# Patient Record
Sex: MALE | Race: WHITE | NOT HISPANIC OR LATINO | Employment: OTHER | URBAN - METROPOLITAN AREA
[De-identification: names, ages, dates, MRNs, and addresses within clinical notes are randomized per-mention and may not be internally consistent; named-entity substitution may affect disease eponyms.]

---

## 2019-06-08 ENCOUNTER — HOSPITAL ENCOUNTER (OUTPATIENT)
Dept: OTHER | Facility: HOSPITAL | Age: 54
Discharge: HOME | End: 2019-06-08
Attending: INTERNAL MEDICINE

## 2019-06-08 LAB
ALBUMIN SERPL-MCNC: 3.9 G/DL (ref 3.5–5)
ALBUMIN/GLOB SERPL: 1 {RATIO} (ref 1.4–2.6)
ALP SERPL-CCNC: 89 U/L (ref 56–119)
ALT SERPL-CCNC: 15 U/L (ref 10–40)
ANION GAP SERPL CALC-SCNC: 17 MMOL/L (ref 8–19)
AST SERPL-CCNC: 18 U/L (ref 15–50)
BILIRUB SERPL-MCNC: 0.27 MG/DL (ref 0.2–1.3)
BUN SERPL-MCNC: 15 MG/DL (ref 5–25)
BUN/CREAT SERPL: 16 {RATIO} (ref 6–20)
CALCIUM SERPL-MCNC: 9.3 MG/DL (ref 8.7–10.4)
CHLORIDE SERPL-SCNC: 101 MMOL/L (ref 99–111)
CHOLEST SERPL-MCNC: 176 MG/DL (ref 107–200)
CHOLEST/HDLC SERPL: 4.6 {RATIO} (ref 3–6)
CONV CO2: 24 MMOL/L (ref 22–32)
CONV TOTAL PROTEIN: 7.7 G/DL (ref 6.3–8.2)
CREAT UR-MCNC: 0.91 MG/DL (ref 0.7–1.2)
GFR SERPLBLD BASED ON 1.73 SQ M-ARVRAT: >60 ML/MIN/{1.73_M2}
GLOBULIN UR ELPH-MCNC: 3.8 G/DL (ref 2–3.5)
GLUCOSE SERPL-MCNC: 121 MG/DL (ref 70–99)
HDLC SERPL-MCNC: 38 MG/DL (ref 40–60)
LDLC SERPL CALC-MCNC: 93 MG/DL (ref 70–100)
OSMOLALITY SERPL CALC.SUM OF ELEC: 288 MOSM/KG (ref 273–304)
POTASSIUM SERPL-SCNC: 4.1 MMOL/L (ref 3.5–5.3)
SODIUM SERPL-SCNC: 138 MMOL/L (ref 135–147)
TRIGL SERPL-MCNC: 224 MG/DL (ref 40–150)
VLDLC SERPL-MCNC: 45 MG/DL (ref 5–37)

## 2020-02-07 ENCOUNTER — HOSPITAL ENCOUNTER (OUTPATIENT)
Dept: OTHER | Facility: HOSPITAL | Age: 55
Discharge: HOME | End: 2020-02-07
Attending: INTERNAL MEDICINE

## 2020-02-07 LAB
ALBUMIN SERPL-MCNC: 3.9 G/DL (ref 3.5–5)
ALBUMIN/GLOB SERPL: 1 {RATIO} (ref 1.4–2.6)
ALP SERPL-CCNC: 83 U/L (ref 56–119)
ALT SERPL-CCNC: 12 U/L (ref 10–40)
ANION GAP SERPL CALC-SCNC: 19 MMOL/L (ref 8–19)
AST SERPL-CCNC: 13 U/L (ref 15–50)
BILIRUB SERPL-MCNC: 0.31 MG/DL (ref 0.2–1.3)
BUN SERPL-MCNC: 13 MG/DL (ref 5–25)
BUN/CREAT SERPL: 15 {RATIO} (ref 6–20)
CALCIUM SERPL-MCNC: 9.4 MG/DL (ref 8.7–10.4)
CHLORIDE SERPL-SCNC: 101 MMOL/L (ref 99–111)
CHOLEST SERPL-MCNC: 175 MG/DL (ref 107–200)
CHOLEST/HDLC SERPL: 4.4 {RATIO} (ref 3–6)
CONV CO2: 24 MMOL/L (ref 22–32)
CONV TOTAL PROTEIN: 7.8 G/DL (ref 6.3–8.2)
CREAT UR-MCNC: 0.86 MG/DL (ref 0.7–1.2)
GFR SERPLBLD BASED ON 1.73 SQ M-ARVRAT: >60 ML/MIN/{1.73_M2}
GLOBULIN UR ELPH-MCNC: 3.9 G/DL (ref 2–3.5)
GLUCOSE SERPL-MCNC: 102 MG/DL (ref 70–99)
HDLC SERPL-MCNC: 40 MG/DL (ref 40–60)
LDLC SERPL CALC-MCNC: 118 MG/DL (ref 70–100)
OSMOLALITY SERPL CALC.SUM OF ELEC: 288 MOSM/KG (ref 273–304)
POTASSIUM SERPL-SCNC: 4.6 MMOL/L (ref 3.5–5.3)
SODIUM SERPL-SCNC: 139 MMOL/L (ref 135–147)
TRIGL SERPL-MCNC: 84 MG/DL (ref 40–150)
VLDLC SERPL-MCNC: 17 MG/DL (ref 5–37)

## 2020-08-15 ENCOUNTER — HOSPITAL ENCOUNTER (OUTPATIENT)
Dept: OTHER | Facility: HOSPITAL | Age: 55
Discharge: HOME | End: 2020-08-15
Attending: INTERNAL MEDICINE

## 2020-08-15 LAB
ALBUMIN SERPL-MCNC: 3.9 G/DL (ref 3.5–5)
ALBUMIN/GLOB SERPL: 1.1 {RATIO} (ref 1.4–2.6)
ALP SERPL-CCNC: 81 U/L (ref 56–119)
ALT SERPL-CCNC: 12 U/L (ref 10–40)
ANION GAP SERPL CALC-SCNC: 13 MMOL/L (ref 8–19)
AST SERPL-CCNC: 16 U/L (ref 15–50)
BILIRUB SERPL-MCNC: 0.35 MG/DL (ref 0.2–1.3)
BUN SERPL-MCNC: 12 MG/DL (ref 5–25)
BUN/CREAT SERPL: 13 {RATIO} (ref 6–20)
CALCIUM SERPL-MCNC: 9.9 MG/DL (ref 8.7–10.4)
CHLORIDE SERPL-SCNC: 103 MMOL/L (ref 99–111)
CHOLEST SERPL-MCNC: 189 MG/DL (ref 107–200)
CHOLEST/HDLC SERPL: 4.7 {RATIO} (ref 3–6)
CONV CO2: 28 MMOL/L (ref 22–32)
CONV TOTAL PROTEIN: 7.5 G/DL (ref 6.3–8.2)
CREAT UR-MCNC: 0.94 MG/DL (ref 0.7–1.2)
GFR SERPLBLD BASED ON 1.73 SQ M-ARVRAT: >60 ML/MIN/{1.73_M2}
GLOBULIN UR ELPH-MCNC: 3.6 G/DL (ref 2–3.5)
GLUCOSE SERPL-MCNC: 105 MG/DL (ref 70–99)
HDLC SERPL-MCNC: 40 MG/DL (ref 40–60)
LDLC SERPL CALC-MCNC: 126 MG/DL (ref 70–100)
OSMOLALITY SERPL CALC.SUM OF ELEC: 288 MOSM/KG (ref 273–304)
POTASSIUM SERPL-SCNC: 4.6 MMOL/L (ref 3.5–5.3)
SODIUM SERPL-SCNC: 139 MMOL/L (ref 135–147)
TRIGL SERPL-MCNC: 115 MG/DL (ref 40–150)
VLDLC SERPL-MCNC: 23 MG/DL (ref 5–37)

## 2021-03-20 ENCOUNTER — HOSPITAL ENCOUNTER (OUTPATIENT)
Dept: OTHER | Facility: HOSPITAL | Age: 56
Discharge: HOME | End: 2021-03-20
Attending: INTERNAL MEDICINE

## 2021-03-20 LAB
ALBUMIN SERPL-MCNC: 3.8 G/DL (ref 3.5–5)
ALBUMIN/GLOB SERPL: 1 {RATIO} (ref 1.4–2.6)
ALP SERPL-CCNC: 80 U/L (ref 56–119)
ALT SERPL-CCNC: 15 U/L (ref 10–40)
ANION GAP SERPL CALC-SCNC: 13 MMOL/L (ref 8–19)
AST SERPL-CCNC: 14 U/L (ref 15–50)
BILIRUB SERPL-MCNC: 0.44 MG/DL (ref 0.2–1.3)
BUN SERPL-MCNC: 10 MG/DL (ref 5–25)
BUN/CREAT SERPL: 13 {RATIO} (ref 6–20)
CALCIUM SERPL-MCNC: 9.5 MG/DL (ref 8.7–10.4)
CHLORIDE SERPL-SCNC: 103 MMOL/L (ref 99–111)
CHOLEST SERPL-MCNC: 187 MG/DL (ref 107–200)
CHOLEST/HDLC SERPL: 5.1 {RATIO} (ref 3–6)
CONV CO2: 27 MMOL/L (ref 22–32)
CONV TOTAL PROTEIN: 7.5 G/DL (ref 6.3–8.2)
CREAT UR-MCNC: 0.8 MG/DL (ref 0.7–1.2)
GFR SERPLBLD BASED ON 1.73 SQ M-ARVRAT: >60 ML/MIN/{1.73_M2}
GLOBULIN UR ELPH-MCNC: 3.7 G/DL (ref 2–3.5)
GLUCOSE SERPL-MCNC: 102 MG/DL (ref 70–99)
HDLC SERPL-MCNC: 37 MG/DL (ref 40–60)
LDLC SERPL CALC-MCNC: 120 MG/DL (ref 70–100)
OSMOLALITY SERPL CALC.SUM OF ELEC: 287 MOSM/KG (ref 273–304)
POTASSIUM SERPL-SCNC: 4.3 MMOL/L (ref 3.5–5.3)
SODIUM SERPL-SCNC: 139 MMOL/L (ref 135–147)
TRIGL SERPL-MCNC: 150 MG/DL (ref 40–150)
VLDLC SERPL-MCNC: 30 MG/DL (ref 5–37)

## 2021-10-02 ENCOUNTER — TRANSCRIBE ORDERS (OUTPATIENT)
Dept: LAB | Facility: HOSPITAL | Age: 56
End: 2021-10-02

## 2021-10-02 ENCOUNTER — LAB (OUTPATIENT)
Dept: LAB | Facility: HOSPITAL | Age: 56
End: 2021-10-02

## 2021-10-02 DIAGNOSIS — R07.9 CHEST PAIN, UNSPECIFIED TYPE: ICD-10-CM

## 2021-10-02 DIAGNOSIS — R07.9 CHEST PAIN, UNSPECIFIED TYPE: Primary | ICD-10-CM

## 2021-10-02 LAB
ALBUMIN SERPL-MCNC: 4.2 G/DL (ref 3.5–5.2)
ALBUMIN/GLOB SERPL: 1.3 G/DL
ALP SERPL-CCNC: 77 U/L (ref 39–117)
ALT SERPL W P-5'-P-CCNC: 14 U/L (ref 1–41)
ANION GAP SERPL CALCULATED.3IONS-SCNC: 11.1 MMOL/L (ref 5–15)
AST SERPL-CCNC: 12 U/L (ref 1–40)
BILIRUB SERPL-MCNC: 0.3 MG/DL (ref 0–1.2)
BUN SERPL-MCNC: 11 MG/DL (ref 6–20)
BUN/CREAT SERPL: 13.3 (ref 7–25)
CALCIUM SPEC-SCNC: 9.2 MG/DL (ref 8.6–10.5)
CHLORIDE SERPL-SCNC: 103 MMOL/L (ref 98–107)
CHOLEST SERPL-MCNC: 204 MG/DL (ref 0–200)
CO2 SERPL-SCNC: 24.9 MMOL/L (ref 22–29)
CREAT SERPL-MCNC: 0.83 MG/DL (ref 0.76–1.27)
GFR SERPL CREATININE-BSD FRML MDRD: 95 ML/MIN/1.73
GLOBULIN UR ELPH-MCNC: 3.3 GM/DL
GLUCOSE SERPL-MCNC: 103 MG/DL (ref 65–99)
HDLC SERPL-MCNC: 35 MG/DL (ref 40–60)
LDLC SERPL CALC-MCNC: 141 MG/DL (ref 0–100)
LDLC/HDLC SERPL: 3.95 {RATIO}
POTASSIUM SERPL-SCNC: 4.1 MMOL/L (ref 3.5–5.2)
PROT SERPL-MCNC: 7.5 G/DL (ref 6–8.5)
SODIUM SERPL-SCNC: 139 MMOL/L (ref 136–145)
TRIGL SERPL-MCNC: 153 MG/DL (ref 0–150)
VLDLC SERPL-MCNC: 28 MG/DL (ref 5–40)

## 2021-10-02 PROCEDURE — 80061 LIPID PANEL: CPT

## 2021-10-02 PROCEDURE — 36415 COLL VENOUS BLD VENIPUNCTURE: CPT

## 2021-10-02 PROCEDURE — 80053 COMPREHEN METABOLIC PANEL: CPT

## 2022-06-25 ENCOUNTER — LAB (OUTPATIENT)
Dept: LAB | Facility: HOSPITAL | Age: 57
End: 2022-06-25

## 2022-06-25 ENCOUNTER — TRANSCRIBE ORDERS (OUTPATIENT)
Dept: CARDIOLOGY | Facility: HOSPITAL | Age: 57
End: 2022-06-25

## 2022-06-25 DIAGNOSIS — I25.9 CHEST PAIN DUE TO MYOCARDIAL ISCHEMIA, UNSPECIFIED ISCHEMIC CHEST PAIN TYPE: ICD-10-CM

## 2022-06-25 DIAGNOSIS — I25.9 CHEST PAIN DUE TO MYOCARDIAL ISCHEMIA, UNSPECIFIED ISCHEMIC CHEST PAIN TYPE: Primary | ICD-10-CM

## 2022-06-25 LAB
ALBUMIN SERPL-MCNC: 4.1 G/DL (ref 3.5–5.2)
ALBUMIN/GLOB SERPL: 1.1 G/DL
ALP SERPL-CCNC: 79 U/L (ref 39–117)
ALT SERPL W P-5'-P-CCNC: 9 U/L (ref 1–41)
ANION GAP SERPL CALCULATED.3IONS-SCNC: 9 MMOL/L (ref 5–15)
AST SERPL-CCNC: 15 U/L (ref 1–40)
BILIRUB SERPL-MCNC: 0.5 MG/DL (ref 0–1.2)
BUN SERPL-MCNC: 9 MG/DL (ref 6–20)
BUN/CREAT SERPL: 11 (ref 7–25)
CALCIUM SPEC-SCNC: 9.4 MG/DL (ref 8.6–10.5)
CHLORIDE SERPL-SCNC: 103 MMOL/L (ref 98–107)
CHOLEST SERPL-MCNC: 207 MG/DL (ref 0–200)
CO2 SERPL-SCNC: 28 MMOL/L (ref 22–29)
CREAT SERPL-MCNC: 0.82 MG/DL (ref 0.76–1.27)
EGFRCR SERPLBLD CKD-EPI 2021: 101.8 ML/MIN/1.73
GLOBULIN UR ELPH-MCNC: 3.6 GM/DL
GLUCOSE SERPL-MCNC: 100 MG/DL (ref 65–99)
HDLC SERPL-MCNC: 40 MG/DL (ref 40–60)
LDLC SERPL CALC-MCNC: 149 MG/DL (ref 0–100)
LDLC/HDLC SERPL: 3.68 {RATIO}
POTASSIUM SERPL-SCNC: 4.2 MMOL/L (ref 3.5–5.2)
PROT SERPL-MCNC: 7.7 G/DL (ref 6–8.5)
SODIUM SERPL-SCNC: 140 MMOL/L (ref 136–145)
TRIGL SERPL-MCNC: 100 MG/DL (ref 0–150)
VLDLC SERPL-MCNC: 18 MG/DL (ref 5–40)

## 2022-06-25 PROCEDURE — 80053 COMPREHEN METABOLIC PANEL: CPT

## 2022-06-25 PROCEDURE — 80061 LIPID PANEL: CPT

## 2023-03-25 ENCOUNTER — TRANSCRIBE ORDERS (OUTPATIENT)
Dept: ADMINISTRATIVE | Facility: HOSPITAL | Age: 58
End: 2023-03-25
Payer: MEDICARE

## 2023-03-25 ENCOUNTER — LAB (OUTPATIENT)
Dept: LAB | Facility: HOSPITAL | Age: 58
End: 2023-03-25
Payer: MEDICARE

## 2023-03-25 DIAGNOSIS — R07.9 CHEST PAIN, UNSPECIFIED TYPE: Primary | ICD-10-CM

## 2023-03-25 DIAGNOSIS — R07.9 CHEST PAIN, UNSPECIFIED TYPE: ICD-10-CM

## 2023-03-25 LAB
ALBUMIN SERPL-MCNC: 3.9 G/DL (ref 3.5–5.2)
ALBUMIN/GLOB SERPL: 1.1 G/DL
ALP SERPL-CCNC: 81 U/L (ref 39–117)
ALT SERPL W P-5'-P-CCNC: 14 U/L (ref 1–41)
ANION GAP SERPL CALCULATED.3IONS-SCNC: 7.1 MMOL/L (ref 5–15)
AST SERPL-CCNC: 15 U/L (ref 1–40)
BILIRUB SERPL-MCNC: 0.4 MG/DL (ref 0–1.2)
BUN SERPL-MCNC: 9 MG/DL (ref 6–20)
BUN/CREAT SERPL: 10.6 (ref 7–25)
CALCIUM SPEC-SCNC: 9.5 MG/DL (ref 8.6–10.5)
CHLORIDE SERPL-SCNC: 103 MMOL/L (ref 98–107)
CHOLEST SERPL-MCNC: 217 MG/DL (ref 0–200)
CO2 SERPL-SCNC: 27.9 MMOL/L (ref 22–29)
CREAT SERPL-MCNC: 0.85 MG/DL (ref 0.76–1.27)
EGFRCR SERPLBLD CKD-EPI 2021: 100.1 ML/MIN/1.73
GLOBULIN UR ELPH-MCNC: 3.7 GM/DL
GLUCOSE SERPL-MCNC: 109 MG/DL (ref 65–99)
HDLC SERPL-MCNC: 42 MG/DL (ref 40–60)
LDLC SERPL CALC-MCNC: 152 MG/DL (ref 0–100)
LDLC/HDLC SERPL: 3.55 {RATIO}
POTASSIUM SERPL-SCNC: 4.4 MMOL/L (ref 3.5–5.2)
PROT SERPL-MCNC: 7.6 G/DL (ref 6–8.5)
SODIUM SERPL-SCNC: 138 MMOL/L (ref 136–145)
TRIGL SERPL-MCNC: 129 MG/DL (ref 0–150)
VLDLC SERPL-MCNC: 23 MG/DL (ref 5–40)

## 2023-03-25 PROCEDURE — 36415 COLL VENOUS BLD VENIPUNCTURE: CPT

## 2023-03-25 PROCEDURE — 80053 COMPREHEN METABOLIC PANEL: CPT

## 2023-03-25 PROCEDURE — 80061 LIPID PANEL: CPT

## 2023-12-22 ENCOUNTER — TRANSCRIBE ORDERS (OUTPATIENT)
Dept: ADMINISTRATIVE | Facility: HOSPITAL | Age: 58
End: 2023-12-22
Payer: MEDICARE

## 2023-12-22 ENCOUNTER — LAB (OUTPATIENT)
Dept: LAB | Facility: HOSPITAL | Age: 58
End: 2023-12-22
Payer: MEDICARE

## 2023-12-22 DIAGNOSIS — R07.9 CHEST PAIN, UNSPECIFIED TYPE: ICD-10-CM

## 2023-12-22 DIAGNOSIS — R07.9 CHEST PAIN, UNSPECIFIED TYPE: Primary | ICD-10-CM

## 2023-12-22 LAB
ALBUMIN SERPL-MCNC: 4 G/DL (ref 3.5–5.2)
ALBUMIN/GLOB SERPL: 1.2 G/DL
ALP SERPL-CCNC: 76 U/L (ref 39–117)
ALT SERPL W P-5'-P-CCNC: 13 U/L (ref 1–41)
ANION GAP SERPL CALCULATED.3IONS-SCNC: 13.6 MMOL/L (ref 5–15)
AST SERPL-CCNC: 13 U/L (ref 1–40)
BILIRUB SERPL-MCNC: 0.3 MG/DL (ref 0–1.2)
BUN SERPL-MCNC: 9 MG/DL (ref 8–23)
BUN/CREAT SERPL: 11.3 (ref 7–25)
CALCIUM SPEC-SCNC: 9.3 MG/DL (ref 8.6–10.5)
CHLORIDE SERPL-SCNC: 104 MMOL/L (ref 98–107)
CHOLEST SERPL-MCNC: 208 MG/DL (ref 0–200)
CO2 SERPL-SCNC: 25.4 MMOL/L (ref 22–29)
CREAT SERPL-MCNC: 0.8 MG/DL (ref 0.76–1.27)
EGFRCR SERPLBLD CKD-EPI 2021: 101.3 ML/MIN/1.73
GLOBULIN UR ELPH-MCNC: 3.4 GM/DL
GLUCOSE SERPL-MCNC: 104 MG/DL (ref 65–99)
HDLC SERPL-MCNC: 39 MG/DL (ref 40–60)
LDLC SERPL CALC-MCNC: 139 MG/DL (ref 0–100)
LDLC/HDLC SERPL: 3.47 {RATIO}
POTASSIUM SERPL-SCNC: 4.3 MMOL/L (ref 3.5–5.2)
PROT SERPL-MCNC: 7.4 G/DL (ref 6–8.5)
SODIUM SERPL-SCNC: 143 MMOL/L (ref 136–145)
TRIGL SERPL-MCNC: 168 MG/DL (ref 0–150)
VLDLC SERPL-MCNC: 30 MG/DL (ref 5–40)

## 2023-12-22 PROCEDURE — 80061 LIPID PANEL: CPT

## 2023-12-22 PROCEDURE — 36415 COLL VENOUS BLD VENIPUNCTURE: CPT

## 2023-12-22 PROCEDURE — 80053 COMPREHEN METABOLIC PANEL: CPT

## 2024-03-25 ENCOUNTER — OFFICE VISIT (OUTPATIENT)
Dept: CARDIOLOGY | Facility: CLINIC | Age: 59
End: 2024-03-25
Payer: MEDICARE

## 2024-03-25 VITALS
HEART RATE: 64 BPM | WEIGHT: 183 LBS | SYSTOLIC BLOOD PRESSURE: 130 MMHG | DIASTOLIC BLOOD PRESSURE: 89 MMHG | HEIGHT: 67 IN | BODY MASS INDEX: 28.72 KG/M2

## 2024-03-25 DIAGNOSIS — I25.10 CORONARY ARTERY DISEASE INVOLVING NATIVE CORONARY ARTERY OF NATIVE HEART WITHOUT ANGINA PECTORIS: Primary | ICD-10-CM

## 2024-03-25 DIAGNOSIS — E78.2 MIXED HYPERLIPIDEMIA: ICD-10-CM

## 2024-03-25 DIAGNOSIS — I10 PRIMARY HYPERTENSION: ICD-10-CM

## 2024-03-25 PROCEDURE — 99204 OFFICE O/P NEW MOD 45 MIN: CPT | Performed by: INTERNAL MEDICINE

## 2024-03-25 RX ORDER — ROSUVASTATIN CALCIUM 10 MG/1
10 TABLET, COATED ORAL EVERY 24 HOURS
COMMUNITY

## 2024-03-25 RX ORDER — ESOMEPRAZOLE MAGNESIUM 40 MG/1
40 CAPSULE, DELAYED RELEASE ORAL EVERY 24 HOURS
COMMUNITY

## 2024-03-25 RX ORDER — ASPIRIN 81 MG/1
81 TABLET ORAL DAILY
COMMUNITY

## 2024-03-25 RX ORDER — METOPROLOL TARTRATE 50 MG/1
50 TABLET, FILM COATED ORAL EVERY 12 HOURS SCHEDULED
COMMUNITY

## 2024-03-25 RX ORDER — LISINOPRIL 20 MG/1
20 TABLET ORAL EVERY 12 HOURS SCHEDULED
COMMUNITY

## 2024-03-25 RX ORDER — PRASUGREL 10 MG/1
10 TABLET, FILM COATED ORAL
COMMUNITY

## 2024-03-25 RX ORDER — ACETAMINOPHEN 500 MG
500 TABLET ORAL EVERY 12 HOURS SCHEDULED
COMMUNITY

## 2024-03-25 RX ORDER — CYCLOBENZAPRINE HCL 10 MG
10 TABLET ORAL EVERY 24 HOURS
COMMUNITY

## 2024-03-25 NOTE — PROGRESS NOTES
Chief Complaint  Establish Care    Subjective        Tom Rose presents to [facility] CARDIOLOGY  History of present illness:    Patient is a 58-year-old male with past medical history significant for coronary artery disease.  He did have multiple stents last 1 in .  He had repeat heart cath 2013 that showed chronic total occlusion of the LAD.  Before the chronic total occlusion there was a diagonal came out that had a patent stent.  The left circumflex and RCA had patent stents.  There was no significant disease.  He had an echocardiogram in  that showed normal ejection fraction and no significant valvular disease.  He is very active with no chest pain.  He notes no palpitations as long as he is on the metoprolol.  He denies any bleeding problems.  He does not smoke or drink alcohol.  Mother had history of heart attack and father  of heart attack.      History reviewed. No pertinent past medical history.      History reviewed. No pertinent surgical history.       Social History     Socioeconomic History    Marital status:    Tobacco Use    Smoking status: Former     Types: Cigarettes    Smokeless tobacco: Never   Vaping Use    Vaping status: Never Used   Substance and Sexual Activity    Alcohol use: Never    Drug use: Never    Sexual activity: Defer         History reviewed. No pertinent family history.       Allergies   Allergen Reactions    Clopidogrel Other (See Comments)    Iodinated Contrast Media Other (See Comments)            Current Outpatient Medications:     acetaminophen (TYLENOL) 500 MG tablet, Take 1 tablet by mouth Every 12 (Twelve) Hours., Disp: , Rfl:     aspirin 81 MG EC tablet, Take 1 tablet by mouth Daily., Disp: , Rfl:     cyclobenzaprine (FLEXERIL) 10 MG tablet, Take 1 tablet by mouth Daily., Disp: , Rfl:     esomeprazole (nexIUM) 40 MG capsule, Take 1 capsule by mouth Daily., Disp: , Rfl:     lisinopril (PRINIVIL,ZESTRIL) 20 MG tablet, Take  "1 tablet by mouth Every 12 (Twelve) Hours., Disp: , Rfl:     metoprolol tartrate (LOPRESSOR) 50 MG tablet, Take 1 tablet by mouth Every 12 (Twelve) Hours., Disp: , Rfl:     prasugrel (EFFIENT) 10 MG tablet, 1 tablet. As directed by the provider, Disp: , Rfl:     rosuvastatin (CRESTOR) 10 MG tablet, Take 1 tablet by mouth Daily., Disp: , Rfl:       ROS:  Cardiac review of systems negative.    Objective     /89   Pulse 64   Ht 170.2 cm (67\")   Wt 83 kg (183 lb)   BMI 28.66 kg/m²       General Appearance:   well developed  well nourished  HENT:   oropharynx moist  lips not cyanotic  Respiratory:  no respiratory distress  normal breath sounds  no rales  Cardiovascular:  no jugular venous distention  regular rhythm  S1 normal, S2 normal  no S3, no S4   no murmur  no rub, no thrill  No carotid bruit  pedal pulses normal  lower extremity edema: none    Musculoskeletal:  no clubbing of fingers.   normocephalic, head atraumatic  Skin:   warm, dry  Psychiatric:  judgement and insight appropriate  normal mood and affect    ECHO:    STRESS:    CATH:  No results found for this or any previous visit.    BMP:     Glucose   Date Value Ref Range Status   12/22/2023 104 (H) 65 - 99 mg/dL Final     BUN   Date Value Ref Range Status   12/22/2023 9 8 - 23 mg/dL Final     Creatinine   Date Value Ref Range Status   12/22/2023 0.80 0.76 - 1.27 mg/dL Final     Sodium   Date Value Ref Range Status   12/22/2023 143 136 - 145 mmol/L Final     Potassium   Date Value Ref Range Status   12/22/2023 4.3 3.5 - 5.2 mmol/L Final     Chloride   Date Value Ref Range Status   12/22/2023 104 98 - 107 mmol/L Final     CO2   Date Value Ref Range Status   12/22/2023 25.4 22.0 - 29.0 mmol/L Final     Calcium   Date Value Ref Range Status   12/22/2023 9.3 8.6 - 10.5 mg/dL Final     BUN/Creatinine Ratio   Date Value Ref Range Status   12/22/2023 11.3 7.0 - 25.0 Final     Anion Gap   Date Value Ref Range Status   12/22/2023 13.6 5.0 - 15.0 mmol/L Final "     eGFR   Date Value Ref Range Status   12/22/2023 101.3 >60.0 mL/min/1.73 Final     LIPIDS:  Total Cholesterol   Date Value Ref Range Status   12/22/2023 208 (H) 0 - 200 mg/dL Final     Triglycerides   Date Value Ref Range Status   12/22/2023 168 (H) 0 - 150 mg/dL Final     HDL Cholesterol   Date Value Ref Range Status   12/22/2023 39 (L) 40 - 60 mg/dL Final     LDL Cholesterol    Date Value Ref Range Status   12/22/2023 139 (H) 0 - 100 mg/dL Final     VLDL Cholesterol   Date Value Ref Range Status   12/22/2023 30 5 - 40 mg/dL Final     LDL/HDL Ratio   Date Value Ref Range Status   12/22/2023 3.47  Final         Procedures             ASSESSMENT:  Diagnoses and all orders for this visit:    1. Coronary artery disease involving native coronary artery of native heart without angina pectoris (Primary)  -     Lipid Panel; Future    2. Primary hypertension    3. Mixed hyperlipidemia  -     Lipid Panel; Future         PLAN:    1.  Continue the aspirin and the Effient.  He notes no bleeding problems.  He can stop the Effient 7 days before the teeth extraction surgery.  I would want him on the 81 mg aspirin perioperatively.  2.  Continue the Crestor.  Patient notes he had not been taking it in the past as statins hurt his legs and arms.  He is tolerating this dose of the Crestor.  We will recheck a fasted lipid profile.  3.  Blood pressure is under good control.  4.  Patient does not smoke.  5.  Patient is very active with no exertional chest pain.  His modifiable risk factors are under excellent control.  We will see what his cholesterol looks like on the Crestor and if needed consider adding the injections.  His goal LDL is less than 70 but would like to get him closer to 30-50 range.      Return in about 6 months (around 9/25/2024).     Patient was given instructions and counseling regarding his condition or for health maintenance advice. Please see specific information pulled into the AVS if appropriate.         Brad  Espinoza Connell MD   3/25/2024  09:09 EDT

## 2024-03-26 ENCOUNTER — LAB (OUTPATIENT)
Dept: LAB | Facility: HOSPITAL | Age: 59
End: 2024-03-26
Payer: MEDICARE

## 2024-03-26 DIAGNOSIS — E78.2 MIXED HYPERLIPIDEMIA: ICD-10-CM

## 2024-03-26 DIAGNOSIS — I25.10 CORONARY ARTERY DISEASE INVOLVING NATIVE CORONARY ARTERY OF NATIVE HEART WITHOUT ANGINA PECTORIS: ICD-10-CM

## 2024-03-26 LAB
CHOLEST SERPL-MCNC: 135 MG/DL (ref 0–200)
HDLC SERPL-MCNC: 40 MG/DL (ref 40–60)
LDLC SERPL CALC-MCNC: 74 MG/DL (ref 0–100)
LDLC/HDLC SERPL: 1.82 {RATIO}
TRIGL SERPL-MCNC: 112 MG/DL (ref 0–150)
VLDLC SERPL-MCNC: 21 MG/DL (ref 5–40)

## 2024-03-26 PROCEDURE — 80061 LIPID PANEL: CPT

## 2024-03-26 PROCEDURE — 36415 COLL VENOUS BLD VENIPUNCTURE: CPT

## 2024-05-23 RX ORDER — CYCLOBENZAPRINE HCL 10 MG
10 TABLET ORAL EVERY 24 HOURS
OUTPATIENT
Start: 2024-05-23

## 2024-05-23 RX ORDER — PRASUGREL 10 MG/1
10 TABLET, FILM COATED ORAL DAILY
Qty: 90 TABLET | Refills: 1 | Status: SHIPPED | OUTPATIENT
Start: 2024-05-23

## 2024-05-23 RX ORDER — ASPIRIN 81 MG/1
81 TABLET ORAL DAILY
OUTPATIENT
Start: 2024-05-23

## 2024-05-23 RX ORDER — LISINOPRIL 20 MG/1
20 TABLET ORAL EVERY 12 HOURS SCHEDULED
Qty: 180 TABLET | Refills: 1 | Status: SHIPPED | OUTPATIENT
Start: 2024-05-23

## 2024-05-23 RX ORDER — ROSUVASTATIN CALCIUM 10 MG/1
10 TABLET, COATED ORAL EVERY 24 HOURS
OUTPATIENT
Start: 2024-05-23

## 2024-05-23 RX ORDER — METOPROLOL TARTRATE 50 MG/1
50 TABLET, FILM COATED ORAL EVERY 12 HOURS SCHEDULED
Qty: 180 TABLET | Refills: 1 | Status: SHIPPED | OUTPATIENT
Start: 2024-05-23

## 2024-05-23 RX ORDER — LISINOPRIL 20 MG/1
20 TABLET ORAL EVERY 12 HOURS SCHEDULED
OUTPATIENT
Start: 2024-05-23

## 2024-05-23 RX ORDER — ROSUVASTATIN CALCIUM 10 MG/1
10 TABLET, COATED ORAL EVERY 24 HOURS
Qty: 90 TABLET | Refills: 1 | Status: SHIPPED | OUTPATIENT
Start: 2024-05-23

## 2024-05-23 RX ORDER — ASPIRIN 81 MG/1
81 TABLET ORAL DAILY
Qty: 90 TABLET | Refills: 1 | Status: SHIPPED | OUTPATIENT
Start: 2024-05-23

## 2024-05-23 RX ORDER — ESOMEPRAZOLE MAGNESIUM 40 MG/1
40 CAPSULE, DELAYED RELEASE ORAL EVERY 24 HOURS
OUTPATIENT
Start: 2024-05-23

## 2024-05-23 RX ORDER — PRASUGREL 10 MG/1
10 TABLET, FILM COATED ORAL
OUTPATIENT
Start: 2024-05-23

## 2024-05-23 RX ORDER — METOPROLOL TARTRATE 50 MG/1
50 TABLET, FILM COATED ORAL EVERY 12 HOURS SCHEDULED
OUTPATIENT
Start: 2024-05-23

## 2024-10-07 ENCOUNTER — OFFICE VISIT (OUTPATIENT)
Dept: CARDIOLOGY | Facility: CLINIC | Age: 59
End: 2024-10-07
Payer: MEDICARE

## 2024-10-07 VITALS
WEIGHT: 177.4 LBS | BODY MASS INDEX: 27.84 KG/M2 | HEART RATE: 63 BPM | SYSTOLIC BLOOD PRESSURE: 159 MMHG | DIASTOLIC BLOOD PRESSURE: 79 MMHG | HEIGHT: 67 IN

## 2024-10-07 DIAGNOSIS — E78.2 MIXED HYPERLIPIDEMIA: ICD-10-CM

## 2024-10-07 DIAGNOSIS — I25.10 CORONARY ARTERY DISEASE INVOLVING NATIVE CORONARY ARTERY OF NATIVE HEART WITHOUT ANGINA PECTORIS: Primary | ICD-10-CM

## 2024-10-07 DIAGNOSIS — I10 PRIMARY HYPERTENSION: ICD-10-CM

## 2024-10-07 PROCEDURE — 99214 OFFICE O/P EST MOD 30 MIN: CPT | Performed by: INTERNAL MEDICINE

## 2024-10-07 NOTE — PROGRESS NOTES
Chief Complaint  Follow-up and Coronary Artery Disease    Subjective        Tom Rose presents to [facility] CARDIOLOGY  History of present illness:    Patient states overall he is doing well.  He is walking a mile every day with no chest pain.  He notes no bleeding problems.  He gets his blood pressure cuff checked at his primary care every 6 months and it is very accurate.  He states he is getting values from 111 to 120 mmHg systolic at home.  He is taking the metoprolol 50 mg 2 pills twice a day.      History reviewed. No pertinent past medical history.      No past surgical history on file.       Social History     Socioeconomic History    Marital status:    Tobacco Use    Smoking status: Former     Types: Cigarettes    Smokeless tobacco: Never   Vaping Use    Vaping status: Never Used   Substance and Sexual Activity    Alcohol use: Never    Drug use: Never    Sexual activity: Defer         No family history on file.       Allergies   Allergen Reactions    Clopidogrel Other (See Comments)    Iodinated Contrast Media Other (See Comments)            Current Outpatient Medications:     acetaminophen (TYLENOL) 500 MG tablet, Take 1 tablet by mouth Every 12 (Twelve) Hours., Disp: , Rfl:     aspirin 81 MG EC tablet, Take 1 tablet by mouth Daily., Disp: 90 tablet, Rfl: 1    cyclobenzaprine (FLEXERIL) 10 MG tablet, Take 0.5 tablets by mouth As Needed., Disp: , Rfl:     esomeprazole (nexIUM) 40 MG capsule, Take 1 capsule by mouth Daily., Disp: , Rfl:     lisinopril (PRINIVIL,ZESTRIL) 20 MG tablet, Take 1 tablet by mouth Every 12 (Twelve) Hours., Disp: 180 tablet, Rfl: 1    metoprolol tartrate (LOPRESSOR) 50 MG tablet, Take 1 tablet by mouth Every 12 (Twelve) Hours. (Patient taking differently: Take 1 tablet by mouth Every 12 (Twelve) Hours. 2 tabs every 12 hours), Disp: 180 tablet, Rfl: 1    prasugrel (EFFIENT) 10 MG tablet, Take 1 tablet by mouth Daily., Disp: 90 tablet, Rfl: 1     "rosuvastatin (CRESTOR) 10 MG tablet, Take 1 tablet by mouth Daily., Disp: 90 tablet, Rfl: 1      ROS:  Cardiac review of systems negative.    Objective     /79   Pulse 63   Ht 170.2 cm (67.01\")   Wt 80.5 kg (177 lb 6.4 oz)   BMI 27.78 kg/m²       General Appearance:   well developed  well nourished  HENT:   oropharynx moist  lips not cyanotic  Respiratory:  no respiratory distress  normal breath sounds  no rales  Cardiovascular:  no jugular venous distention  regular rhythm  S1 normal, S2 normal  no S3, no S4   no murmur  no rub, no thrill  No carotid bruit  pedal pulses normal  lower extremity edema: none    Musculoskeletal:  no clubbing of fingers.   normocephalic, head atraumatic  Skin:   warm, dry  Psychiatric:  judgement and insight appropriate  normal mood and affect    ECHO:    STRESS:    CATH:  No results found for this or any previous visit.    BMP:     Glucose   Date Value Ref Range Status   12/22/2023 104 (H) 65 - 99 mg/dL Final     BUN   Date Value Ref Range Status   12/22/2023 9 8 - 23 mg/dL Final     Creatinine   Date Value Ref Range Status   12/22/2023 0.80 0.76 - 1.27 mg/dL Final     Sodium   Date Value Ref Range Status   12/22/2023 143 136 - 145 mmol/L Final     Potassium   Date Value Ref Range Status   12/22/2023 4.3 3.5 - 5.2 mmol/L Final     Chloride   Date Value Ref Range Status   12/22/2023 104 98 - 107 mmol/L Final     CO2   Date Value Ref Range Status   12/22/2023 25.4 22.0 - 29.0 mmol/L Final     Calcium   Date Value Ref Range Status   12/22/2023 9.3 8.6 - 10.5 mg/dL Final     BUN/Creatinine Ratio   Date Value Ref Range Status   12/22/2023 11.3 7.0 - 25.0 Final     Anion Gap   Date Value Ref Range Status   12/22/2023 13.6 5.0 - 15.0 mmol/L Final     eGFR   Date Value Ref Range Status   12/22/2023 101.3 >60.0 mL/min/1.73 Final     LIPIDS:  Total Cholesterol   Date Value Ref Range Status   03/26/2024 135 0 - 200 mg/dL Final     Triglycerides   Date Value Ref Range Status   03/26/2024 " 112 0 - 150 mg/dL Final     HDL Cholesterol   Date Value Ref Range Status   03/26/2024 40 40 - 60 mg/dL Final     LDL Cholesterol    Date Value Ref Range Status   03/26/2024 74 0 - 100 mg/dL Final     VLDL Cholesterol   Date Value Ref Range Status   03/26/2024 21 5 - 40 mg/dL Final     LDL/HDL Ratio   Date Value Ref Range Status   03/26/2024 1.82  Final         Procedures             ASSESSMENT:  Diagnoses and all orders for this visit:    1. Coronary artery disease involving native coronary artery of native heart without angina pectoris (Primary)    2. Primary hypertension    3. Mixed hyperlipidemia         PLAN:    1.  Continue the aspirin and Effient.  The patient notes no problems with bleeding.  2.  Patient currently takes metoprolol 50 mg 2 pills twice a day.  He has been doing this for a long time and does not want to switch to the 100 mg pill.  3.  Continue the Crestor.  Patient had cholesterol checked 3/26/2024 with LDL 74, HDL 40, and triglycerides 112.  These are much improved.  We will continue to monitor.  If needed could add the cholesterol injections.  Asked him to have any blood work faxed to us through primary care's office to also checks the cholesterol.  4.  Patient's blood pressure is elevated here but he has his blood pressure monitor checked at his primary care's every 6 months and it is accurate.  He is getting good values at home.  We will just continue to monitor.  5.  Patient does not smoke.  6.  Encouraged the patient to continue to remain active and call us if any exertional chest pain.      Return in about 6 months (around 4/7/2025).     Patient was given instructions and counseling regarding his condition or for health maintenance advice. Please see specific information pulled into the AVS if appropriate.         Brad Connell MD   10/7/2024  10:35 EDT

## 2024-11-18 RX ORDER — METOPROLOL TARTRATE 50 MG
50 TABLET ORAL EVERY 12 HOURS SCHEDULED
Qty: 60 TABLET | Refills: 3 | Status: SHIPPED | OUTPATIENT
Start: 2024-11-18 | End: 2024-11-22 | Stop reason: SDUPTHER

## 2024-11-22 RX ORDER — METOPROLOL TARTRATE 50 MG
100 TABLET ORAL 2 TIMES DAILY
Qty: 360 TABLET | Refills: 3 | Status: SHIPPED | OUTPATIENT
Start: 2024-11-22

## 2024-11-22 NOTE — TELEPHONE ENCOUNTER
Verified dosage of medication from last office note per Dr. Connell. Note stated patient takes metoprolol 50mg 2 tablets twice daily.  Called pharmacy to verify directions for medication refill.

## 2024-12-23 ENCOUNTER — LAB (OUTPATIENT)
Facility: HOSPITAL | Age: 59
End: 2024-12-23
Payer: MEDICARE

## 2024-12-23 ENCOUNTER — TRANSCRIBE ORDERS (OUTPATIENT)
Dept: ADMINISTRATIVE | Facility: HOSPITAL | Age: 59
End: 2024-12-23
Payer: MEDICARE

## 2024-12-23 DIAGNOSIS — E78.5 HYPERLIPIDEMIA, UNSPECIFIED HYPERLIPIDEMIA TYPE: ICD-10-CM

## 2024-12-23 DIAGNOSIS — I50.9 HEART FAILURE, UNSPECIFIED HF CHRONICITY, UNSPECIFIED HEART FAILURE TYPE: ICD-10-CM

## 2024-12-23 DIAGNOSIS — E78.5 HYPERLIPIDEMIA, UNSPECIFIED HYPERLIPIDEMIA TYPE: Primary | ICD-10-CM

## 2024-12-23 LAB
ALBUMIN SERPL-MCNC: 4.1 G/DL (ref 3.5–5.2)
ALBUMIN/GLOB SERPL: 1.2 G/DL
ALP SERPL-CCNC: 73 U/L (ref 39–117)
ALT SERPL W P-5'-P-CCNC: 11 U/L (ref 1–41)
ANION GAP SERPL CALCULATED.3IONS-SCNC: 7.5 MMOL/L (ref 5–15)
AST SERPL-CCNC: 16 U/L (ref 1–40)
BASOPHILS # BLD AUTO: 0.04 10*3/MM3 (ref 0–0.2)
BASOPHILS NFR BLD AUTO: 0.7 % (ref 0–1.5)
BILIRUB SERPL-MCNC: 0.4 MG/DL (ref 0–1.2)
BUN SERPL-MCNC: 12 MG/DL (ref 8–23)
BUN/CREAT SERPL: 14 (ref 7–25)
CALCIUM SPEC-SCNC: 9.1 MG/DL (ref 8.6–10.5)
CHLORIDE SERPL-SCNC: 104 MMOL/L (ref 98–107)
CHOLEST SERPL-MCNC: 129 MG/DL (ref 0–200)
CO2 SERPL-SCNC: 26.5 MMOL/L (ref 22–29)
CREAT SERPL-MCNC: 0.86 MG/DL (ref 0.76–1.27)
DEPRECATED RDW RBC AUTO: 39.5 FL (ref 37–54)
EGFRCR SERPLBLD CKD-EPI 2021: 98.5 ML/MIN/1.73
EOSINOPHIL # BLD AUTO: 0.17 10*3/MM3 (ref 0–0.4)
EOSINOPHIL NFR BLD AUTO: 3.1 % (ref 0.3–6.2)
ERYTHROCYTE [DISTWIDTH] IN BLOOD BY AUTOMATED COUNT: 12.3 % (ref 12.3–15.4)
GLOBULIN UR ELPH-MCNC: 3.5 GM/DL
GLUCOSE SERPL-MCNC: 109 MG/DL (ref 65–99)
HCT VFR BLD AUTO: 41 % (ref 37.5–51)
HDLC SERPL-MCNC: 39 MG/DL (ref 40–60)
HGB BLD-MCNC: 13.7 G/DL (ref 13–17.7)
IMM GRANULOCYTES # BLD AUTO: 0.02 10*3/MM3 (ref 0–0.05)
IMM GRANULOCYTES NFR BLD AUTO: 0.4 % (ref 0–0.5)
LDLC SERPL CALC-MCNC: 68 MG/DL (ref 0–100)
LDLC/HDLC SERPL: 1.69 {RATIO}
LYMPHOCYTES # BLD AUTO: 1.43 10*3/MM3 (ref 0.7–3.1)
LYMPHOCYTES NFR BLD AUTO: 25.9 % (ref 19.6–45.3)
MCH RBC QN AUTO: 29.3 PG (ref 26.6–33)
MCHC RBC AUTO-ENTMCNC: 33.4 G/DL (ref 31.5–35.7)
MCV RBC AUTO: 87.8 FL (ref 79–97)
MONOCYTES # BLD AUTO: 0.62 10*3/MM3 (ref 0.1–0.9)
MONOCYTES NFR BLD AUTO: 11.2 % (ref 5–12)
NEUTROPHILS NFR BLD AUTO: 3.24 10*3/MM3 (ref 1.7–7)
NEUTROPHILS NFR BLD AUTO: 58.7 % (ref 42.7–76)
NRBC BLD AUTO-RTO: 0 /100 WBC (ref 0–0.2)
NT-PROBNP SERPL-MCNC: 296 PG/ML (ref 0–900)
PLATELET # BLD AUTO: 248 10*3/MM3 (ref 140–450)
PMV BLD AUTO: 10.1 FL (ref 6–12)
POTASSIUM SERPL-SCNC: 4.5 MMOL/L (ref 3.5–5.2)
PROT SERPL-MCNC: 7.6 G/DL (ref 6–8.5)
RBC # BLD AUTO: 4.67 10*6/MM3 (ref 4.14–5.8)
SODIUM SERPL-SCNC: 138 MMOL/L (ref 136–145)
TRIGL SERPL-MCNC: 120 MG/DL (ref 0–150)
TSH SERPL DL<=0.05 MIU/L-ACNC: 3.02 UIU/ML (ref 0.27–4.2)
VLDLC SERPL-MCNC: 22 MG/DL (ref 5–40)
WBC NRBC COR # BLD AUTO: 5.52 10*3/MM3 (ref 3.4–10.8)

## 2024-12-23 PROCEDURE — 80061 LIPID PANEL: CPT

## 2024-12-23 PROCEDURE — 84443 ASSAY THYROID STIM HORMONE: CPT

## 2024-12-23 PROCEDURE — 80053 COMPREHEN METABOLIC PANEL: CPT

## 2024-12-23 PROCEDURE — 36415 COLL VENOUS BLD VENIPUNCTURE: CPT

## 2024-12-23 PROCEDURE — 85025 COMPLETE CBC W/AUTO DIFF WBC: CPT

## 2024-12-23 PROCEDURE — 83880 ASSAY OF NATRIURETIC PEPTIDE: CPT

## 2025-03-26 ENCOUNTER — TRANSCRIBE ORDERS (OUTPATIENT)
Dept: ADMINISTRATIVE | Facility: HOSPITAL | Age: 60
End: 2025-03-26
Payer: MEDICARE

## 2025-03-26 DIAGNOSIS — I50.9 HEART FAILURE, UNSPECIFIED HF CHRONICITY, UNSPECIFIED HEART FAILURE TYPE: Primary | ICD-10-CM

## 2025-03-26 DIAGNOSIS — K21.9 GASTRIC REFLUX: ICD-10-CM

## 2025-03-26 DIAGNOSIS — E78.5 HYPERLIPIDEMIA, UNSPECIFIED HYPERLIPIDEMIA TYPE: ICD-10-CM

## 2025-03-26 DIAGNOSIS — Z12.5 SPECIAL SCREENING, PROSTATE CANCER: ICD-10-CM

## 2025-03-26 DIAGNOSIS — E55.9 VITAMIN D DEFICIENCY: ICD-10-CM

## 2025-06-09 ENCOUNTER — OFFICE VISIT (OUTPATIENT)
Dept: CARDIOLOGY | Facility: CLINIC | Age: 60
End: 2025-06-09
Payer: MEDICARE

## 2025-06-09 VITALS
WEIGHT: 171 LBS | DIASTOLIC BLOOD PRESSURE: 84 MMHG | HEART RATE: 70 BPM | BODY MASS INDEX: 26.84 KG/M2 | HEIGHT: 67 IN | SYSTOLIC BLOOD PRESSURE: 135 MMHG

## 2025-06-09 DIAGNOSIS — E78.2 MIXED HYPERLIPIDEMIA: ICD-10-CM

## 2025-06-09 DIAGNOSIS — I25.10 CORONARY ARTERY DISEASE INVOLVING NATIVE CORONARY ARTERY OF NATIVE HEART WITHOUT ANGINA PECTORIS: Primary | ICD-10-CM

## 2025-06-09 DIAGNOSIS — I10 PRIMARY HYPERTENSION: ICD-10-CM

## 2025-06-09 PROCEDURE — 99214 OFFICE O/P EST MOD 30 MIN: CPT | Performed by: INTERNAL MEDICINE

## 2025-06-09 NOTE — PROGRESS NOTES
Chief Complaint  Coronary artery disease involving native coronary artery of (6m Follow Up)    Subjective        Tom Rose presents to [facility] CARDIOLOGY  History of present illness:    Patient states overall he is doing well.  He is active almost all day long.  He also walks a mile to a mile and a half most days except for when it is raining.  He notes no exertional chest pain.  He denies any palpitations, edema or bleeding problems.      Past Medical History:   Diagnosis Date    Hypertension          Past Surgical History:   Procedure Laterality Date    CARDIAC SURGERY            Social History     Socioeconomic History    Marital status:    Tobacco Use    Smoking status: Former     Types: Cigarettes    Smokeless tobacco: Never   Vaping Use    Vaping status: Never Used   Substance and Sexual Activity    Alcohol use: Never    Drug use: Never    Sexual activity: Defer         History reviewed. No pertinent family history.       Allergies   Allergen Reactions    Clopidogrel Other (See Comments)    Iodinated Contrast Media Other (See Comments)            Current Outpatient Medications:     acetaminophen (TYLENOL) 500 MG tablet, Take 1 tablet by mouth Every 12 (Twelve) Hours., Disp: , Rfl:     aspirin 81 MG EC tablet, Take 1 tablet by mouth Daily. (Patient taking differently: Take 1 tablet by mouth 2 (Two) Times a Day.), Disp: 90 tablet, Rfl: 1    cyclobenzaprine (FLEXERIL) 10 MG tablet, Take 0.5 tablets by mouth As Needed., Disp: , Rfl:     esomeprazole (nexIUM) 40 MG capsule, Take 1 capsule by mouth Daily., Disp: , Rfl:     lisinopril (PRINIVIL,ZESTRIL) 20 MG tablet, Take 1 tablet by mouth Every 12 (Twelve) Hours., Disp: 180 tablet, Rfl: 1    metoprolol tartrate (LOPRESSOR) 50 MG tablet, Take 2 tablets by mouth 2 (Two) Times a Day. 2 tabs every 12 hours, Disp: 360 tablet, Rfl: 3    prasugrel (EFFIENT) 10 MG tablet, Take 1 tablet by mouth Daily., Disp: 90 tablet, Rfl: 1     "rosuvastatin (CRESTOR) 10 MG tablet, Take 1 tablet by mouth Daily., Disp: 90 tablet, Rfl: 1    meclizine (ANTIVERT) 25 MG tablet, Take 1 tablet by mouth 4 (Four) Times a Day As Needed for Dizziness for up to 20 doses. (Patient not taking: Reported on 6/9/2025), Disp: 20 tablet, Rfl: 0      ROS:  Cardiac review of systems negative.    Objective     /84   Pulse 70   Ht 170.2 cm (67\")   Wt 77.6 kg (171 lb)   BMI 26.78 kg/m²       General Appearance:   well developed  well nourished  HENT:   oropharynx moist  lips not cyanotic  Respiratory:  no respiratory distress  normal breath sounds  no rales  Cardiovascular:  no jugular venous distention  regular rhythm  S1 normal, S2 normal  no S3, no S4   no murmur  no rub, no thrill  No carotid bruit  pedal pulses normal  lower extremity edema: none    Musculoskeletal:  no clubbing of fingers.   normocephalic, head atraumatic  Skin:   warm, dry  Psychiatric:  judgement and insight appropriate  normal mood and affect    ECHO:  No results found for this or any previous visit.    STRESS:    CATH:  No results found for this or any previous visit.    BMP:     Glucose   Date Value Ref Range Status   12/23/2024 109 (H) 65 - 99 mg/dL Final     BUN   Date Value Ref Range Status   12/23/2024 12 8 - 23 mg/dL Final     Creatinine   Date Value Ref Range Status   12/23/2024 0.86 0.76 - 1.27 mg/dL Final     Sodium   Date Value Ref Range Status   12/23/2024 138 136 - 145 mmol/L Final     Potassium   Date Value Ref Range Status   12/23/2024 4.5 3.5 - 5.2 mmol/L Final     Chloride   Date Value Ref Range Status   12/23/2024 104 98 - 107 mmol/L Final     CO2   Date Value Ref Range Status   12/23/2024 26.5 22.0 - 29.0 mmol/L Final     Calcium   Date Value Ref Range Status   12/23/2024 9.1 8.6 - 10.5 mg/dL Final     BUN/Creatinine Ratio   Date Value Ref Range Status   12/23/2024 14.0 7.0 - 25.0 Final     Anion Gap   Date Value Ref Range Status   12/23/2024 7.5 5.0 - 15.0 mmol/L Final "     eGFR   Date Value Ref Range Status   12/23/2024 98.5 >60.0 mL/min/1.73 Final     LIPIDS:  Total Cholesterol   Date Value Ref Range Status   12/23/2024 129 0 - 200 mg/dL Final     Triglycerides   Date Value Ref Range Status   12/23/2024 120 0 - 150 mg/dL Final     HDL Cholesterol   Date Value Ref Range Status   12/23/2024 39 (L) 40 - 60 mg/dL Final     LDL Cholesterol    Date Value Ref Range Status   12/23/2024 68 0 - 100 mg/dL Final     VLDL Cholesterol   Date Value Ref Range Status   12/23/2024 22 5 - 40 mg/dL Final     LDL/HDL Ratio   Date Value Ref Range Status   12/23/2024 1.69  Final         Procedures             ASSESSMENT:  Diagnoses and all orders for this visit:    1. Coronary artery disease involving native coronary artery of native heart without angina pectoris (Primary)    2. Primary hypertension    3. Mixed hyperlipidemia         PLAN:    1.  Continue the aspirin and Effient.  He has been on this for years since his very remote stents.  He tried Plavix with side effects.  He notes no bleeding problems.  2.  Blood pressures under good control.  3.  Continue the Crestor.  Patient cholesterol checked 12/23/2024 with LDL 68, HDL 39, and triglycerides 120.  These are under excellent control.  4.  Patient does not smoke.  5.  Encouraged the patient to continue to remain active and call us if any exertional cardiac complaints.  Overall the patient appears to be doing very well from a heart standpoint and his modifiable risk factors are under excellent control.      Return in about 6 months (around 12/9/2025).     Patient was given instructions and counseling regarding his condition or for health maintenance advice. Please see specific information pulled into the AVS if appropriate.         Brad Connell MD   6/9/2025  15:38 EDT

## 2025-06-28 ENCOUNTER — LAB (OUTPATIENT)
Facility: HOSPITAL | Age: 60
End: 2025-06-28
Payer: MEDICARE

## 2025-06-28 DIAGNOSIS — E78.5 HYPERLIPIDEMIA, UNSPECIFIED HYPERLIPIDEMIA TYPE: ICD-10-CM

## 2025-06-28 DIAGNOSIS — Z12.5 SPECIAL SCREENING, PROSTATE CANCER: ICD-10-CM

## 2025-06-28 DIAGNOSIS — K21.9 GASTRIC REFLUX: ICD-10-CM

## 2025-06-28 DIAGNOSIS — I50.9 HEART FAILURE, UNSPECIFIED HF CHRONICITY, UNSPECIFIED HEART FAILURE TYPE: ICD-10-CM

## 2025-06-28 DIAGNOSIS — E55.9 VITAMIN D DEFICIENCY: ICD-10-CM

## 2025-06-28 LAB
25(OH)D3 SERPL-MCNC: 30.5 NG/ML (ref 30–100)
ALBUMIN SERPL-MCNC: 3.9 G/DL (ref 3.5–5.2)
ALBUMIN/GLOB SERPL: 1.1 G/DL
ALP SERPL-CCNC: 71 U/L (ref 39–117)
ALT SERPL W P-5'-P-CCNC: 24 U/L (ref 1–41)
ANION GAP SERPL CALCULATED.3IONS-SCNC: 9.7 MMOL/L (ref 5–15)
AST SERPL-CCNC: 18 U/L (ref 1–40)
BASOPHILS # BLD AUTO: 0.03 10*3/MM3 (ref 0–0.2)
BASOPHILS NFR BLD AUTO: 0.6 % (ref 0–1.5)
BILIRUB SERPL-MCNC: 0.4 MG/DL (ref 0–1.2)
BUN SERPL-MCNC: 11 MG/DL (ref 8–23)
BUN/CREAT SERPL: 12.5 (ref 7–25)
CALCIUM SPEC-SCNC: 9.1 MG/DL (ref 8.6–10.5)
CHLORIDE SERPL-SCNC: 103 MMOL/L (ref 98–107)
CHOLEST SERPL-MCNC: 137 MG/DL (ref 0–200)
CO2 SERPL-SCNC: 26.3 MMOL/L (ref 22–29)
CREAT SERPL-MCNC: 0.88 MG/DL (ref 0.76–1.27)
DEPRECATED RDW RBC AUTO: 41 FL (ref 37–54)
EGFRCR SERPLBLD CKD-EPI 2021: 97.8 ML/MIN/1.73
EOSINOPHIL # BLD AUTO: 0.21 10*3/MM3 (ref 0–0.4)
EOSINOPHIL NFR BLD AUTO: 4.2 % (ref 0.3–6.2)
ERYTHROCYTE [DISTWIDTH] IN BLOOD BY AUTOMATED COUNT: 12.7 % (ref 12.3–15.4)
GLOBULIN UR ELPH-MCNC: 3.5 GM/DL
GLUCOSE SERPL-MCNC: 112 MG/DL (ref 65–99)
HCT VFR BLD AUTO: 39.4 % (ref 37.5–51)
HDLC SERPL-MCNC: 38 MG/DL (ref 40–60)
HGB BLD-MCNC: 13.1 G/DL (ref 13–17.7)
IMM GRANULOCYTES # BLD AUTO: 0.01 10*3/MM3 (ref 0–0.05)
IMM GRANULOCYTES NFR BLD AUTO: 0.2 % (ref 0–0.5)
LDLC SERPL CALC-MCNC: 82 MG/DL (ref 0–100)
LDLC/HDLC SERPL: 2.13 {RATIO}
LYMPHOCYTES # BLD AUTO: 1.37 10*3/MM3 (ref 0.7–3.1)
LYMPHOCYTES NFR BLD AUTO: 27.5 % (ref 19.6–45.3)
MCH RBC QN AUTO: 29.2 PG (ref 26.6–33)
MCHC RBC AUTO-ENTMCNC: 33.2 G/DL (ref 31.5–35.7)
MCV RBC AUTO: 87.9 FL (ref 79–97)
MONOCYTES # BLD AUTO: 0.44 10*3/MM3 (ref 0.1–0.9)
MONOCYTES NFR BLD AUTO: 8.8 % (ref 5–12)
NEUTROPHILS NFR BLD AUTO: 2.92 10*3/MM3 (ref 1.7–7)
NEUTROPHILS NFR BLD AUTO: 58.7 % (ref 42.7–76)
NRBC BLD AUTO-RTO: 0 /100 WBC (ref 0–0.2)
NT-PROBNP SERPL-MCNC: 349 PG/ML (ref 0–900)
PLATELET # BLD AUTO: 244 10*3/MM3 (ref 140–450)
PMV BLD AUTO: 9.9 FL (ref 6–12)
POTASSIUM SERPL-SCNC: 4.7 MMOL/L (ref 3.5–5.2)
PROT SERPL-MCNC: 7.4 G/DL (ref 6–8.5)
PSA SERPL-MCNC: 0.79 NG/ML (ref 0–4)
RBC # BLD AUTO: 4.48 10*6/MM3 (ref 4.14–5.8)
SODIUM SERPL-SCNC: 139 MMOL/L (ref 136–145)
TRIGL SERPL-MCNC: 90 MG/DL (ref 0–150)
VLDLC SERPL-MCNC: 17 MG/DL (ref 5–40)
WBC NRBC COR # BLD AUTO: 4.98 10*3/MM3 (ref 3.4–10.8)

## 2025-06-28 PROCEDURE — G0103 PSA SCREENING: HCPCS

## 2025-06-28 PROCEDURE — 80053 COMPREHEN METABOLIC PANEL: CPT

## 2025-06-28 PROCEDURE — 36415 COLL VENOUS BLD VENIPUNCTURE: CPT

## 2025-06-28 PROCEDURE — 80061 LIPID PANEL: CPT

## 2025-06-28 PROCEDURE — 85025 COMPLETE CBC W/AUTO DIFF WBC: CPT

## 2025-06-28 PROCEDURE — 83880 ASSAY OF NATRIURETIC PEPTIDE: CPT

## 2025-06-28 PROCEDURE — 82306 VITAMIN D 25 HYDROXY: CPT
